# Patient Record
Sex: MALE | Race: OTHER | HISPANIC OR LATINO | Employment: UNEMPLOYED | URBAN - METROPOLITAN AREA
[De-identification: names, ages, dates, MRNs, and addresses within clinical notes are randomized per-mention and may not be internally consistent; named-entity substitution may affect disease eponyms.]

---

## 2022-07-04 ENCOUNTER — HOSPITAL ENCOUNTER (EMERGENCY)
Facility: HOSPITAL | Age: 52
Discharge: HOME | End: 2022-07-04
Attending: EMERGENCY MEDICINE

## 2022-07-04 VITALS
HEART RATE: 71 BPM | OXYGEN SATURATION: 99 % | RESPIRATION RATE: 18 BRPM | SYSTOLIC BLOOD PRESSURE: 169 MMHG | HEIGHT: 65 IN | DIASTOLIC BLOOD PRESSURE: 98 MMHG | BODY MASS INDEX: 27.99 KG/M2 | TEMPERATURE: 99 F | WEIGHT: 168 LBS

## 2022-07-04 DIAGNOSIS — L23.7 POISON IVY DERMATITIS: ICD-10-CM

## 2022-07-04 DIAGNOSIS — L25.5 TOXICODENDRON DERMATITIS: Primary | ICD-10-CM

## 2022-07-04 LAB — POCT GLUCOSE: 125 MG/DL (ref 70–110)

## 2022-07-04 PROCEDURE — 96372 THER/PROPH/DIAG INJ SC/IM: CPT | Performed by: EMERGENCY MEDICINE

## 2022-07-04 PROCEDURE — 99284 EMERGENCY DEPT VISIT MOD MDM: CPT | Mod: 25

## 2022-07-04 PROCEDURE — 63600175 PHARM REV CODE 636 W HCPCS: Performed by: EMERGENCY MEDICINE

## 2022-07-04 PROCEDURE — 82962 GLUCOSE BLOOD TEST: CPT

## 2022-07-04 RX ORDER — CLOBETASOL PROPIONATE 0.5 MG/G
CREAM TOPICAL 2 TIMES DAILY
Qty: 30 G | Refills: 0 | Status: SHIPPED | OUTPATIENT
Start: 2022-07-04

## 2022-07-04 RX ORDER — METHYLPREDNISOLONE 4 MG/1
TABLET ORAL
Qty: 1 EACH | Refills: 0 | Status: SHIPPED | OUTPATIENT
Start: 2022-07-04 | End: 2022-07-25

## 2022-07-04 RX ORDER — KETOROLAC TROMETHAMINE 30 MG/ML
30 INJECTION, SOLUTION INTRAMUSCULAR; INTRAVENOUS
Status: COMPLETED | OUTPATIENT
Start: 2022-07-04 | End: 2022-07-04

## 2022-07-04 RX ORDER — DEXAMETHASONE SODIUM PHOSPHATE 4 MG/ML
4 INJECTION, SOLUTION INTRA-ARTICULAR; INTRALESIONAL; INTRAMUSCULAR; INTRAVENOUS; SOFT TISSUE
Status: COMPLETED | OUTPATIENT
Start: 2022-07-04 | End: 2022-07-04

## 2022-07-04 RX ADMIN — KETOROLAC TROMETHAMINE 30 MG: 30 INJECTION, SOLUTION INTRAMUSCULAR; INTRAVENOUS at 03:07

## 2022-07-04 RX ADMIN — DEXAMETHASONE SODIUM PHOSPHATE 4 MG: 4 INJECTION INTRA-ARTICULAR; INTRALESIONAL; INTRAMUSCULAR; INTRAVENOUS; SOFT TISSUE at 03:07

## 2022-07-04 NOTE — ED PROVIDER NOTES
Encounter Date: 7/4/2022       History     Chief Complaint   Patient presents with    Rash     Facial rash that began after possibly accidentally rubbing poison ivy on face and bilateral arms , pt works as  a       Patient reports rash to face and bilateral arms x 4-5 days. Pt believes he may have contracted poison ivy to his bilateral arms. He states that he accidentally touched his face and now he reports rash, redness to his face. He states that he put alcohol on his face which made it worse. He denies any rash or itching elsewhere, new soaps, detergents or other possible suspicious contacts.         Review of patient's allergies indicates:  No Known Allergies  No past medical history on file.  No past surgical history on file.  No family history on file.     Review of Systems   Constitutional: Negative for chills and fever.   Respiratory: Negative for chest tightness and shortness of breath.    Cardiovascular: Negative for chest pain, palpitations and leg swelling.   Gastrointestinal: Negative for abdominal pain, nausea and vomiting.   Musculoskeletal: Negative for back pain and myalgias.   Skin: Positive for rash. Negative for pallor.   Neurological: Negative for dizziness and headaches.   Psychiatric/Behavioral: Negative for agitation and confusion.       Physical Exam     Initial Vitals [07/04/22 1357]   BP Pulse Resp Temp SpO2   (!) 169/98 71 18 98.8 °F (37.1 °C) 99 %      MAP       --         Physical Exam    Nursing note and vitals reviewed.  Constitutional: He appears well-developed and well-nourished.   HENT:   Head: Normocephalic and atraumatic.       Right Ear: External ear normal.   Left Ear: External ear normal.   Linear erythematous eruptions to the face as indicated suggestive of possible Toxicodendron dermatitis    Eyes: Conjunctivae and EOM are normal. Pupils are equal, round, and reactive to light.   Neck: Neck supple. No thyromegaly present.   Normal range of motion.  Cardiovascular:  Normal rate, regular rhythm, normal heart sounds and intact distal pulses.   Pulmonary/Chest: Breath sounds normal. He has no wheezes.   Lungs clear; no wheezes    Abdominal: Abdomen is soft. Bowel sounds are normal.   Musculoskeletal:         General: Normal range of motion.        Arms:       Cervical back: Normal range of motion and neck supple.        Legs:       Comments: Linear, erythematous, vesicular eruption as noted highly suggestive of a likely Toxicodendron dermatitis     Neurological: He is alert and oriented to person, place, and time. He has normal strength. GCS score is 15. GCS eye subscore is 4. GCS verbal subscore is 5. GCS motor subscore is 6.   Skin: Capillary refill takes less than 2 seconds. Rash noted.   Psychiatric: He has a normal mood and affect.         ED Course   Procedures  Labs Reviewed   POCT GLUCOSE - Abnormal; Notable for the following components:       Result Value    POCT Glucose 125 (*)     All other components within normal limits   POCT GLUCOSE MONITORING CONTINUOUS          Imaging Results    None          Medications   ketorolac injection 30 mg (30 mg Intramuscular Given 7/4/22 1514)   dexamethasone injection 4 mg (4 mg Intramuscular Given 7/4/22 1507)     Medical Decision Making:   ED Management:  - concern for widespread Toxicodendron dermatitis  - will treat with Medrol dose pack, topical steroid  - pt given strict return precautions for any new or worsening of symptoms  - No further intervention is indicated at this time after having taken into account the patient's history, physical exam findings, and empirical and objective data obtained during the patient's emergency department workup.   - The patient is at low risk for an emergent medical condition at this time, and I am of the belief that that it is safe to discharge the patient from the emergency department.   - The patient is instructed to follow up as outpatient as indicated on the discharge paperwork.    - I have  discussed the specifics of the workup with the patient and the patient has verbalized understanding of the details of the workup, the diagnosis, the treatment plan, and the need for outpatient follow-up.    - Although the patient has no emergent etiology today this does not preclude the development of an emergent condition so, in addition, I have advised the patient that they can return to the ED and/or activate EMS at any time with worsening of their symptoms, change of their symptoms, or with any other medical complaint.    - The patient remained comfortable and stable during their visit in the ED.    - Discharge and follow-up instructions discussed with the patient who expressed understanding and willingness to comply with my recommendations.  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 Provider    methylPREDNISolone (MEDROL DOSEPACK) 4 mg tablet Take as indicated on package 1 each 7/4/2022 7/25/2022 Mark Goss MD    clobetasoL (TEMOVATE) 0.05 % cream Apply topically 2 (two) times daily. 30 g 7/4/2022  Mark Goss MD        Follow-up Information    None          Mark Goss MD  07/04/22 2519